# Patient Record
Sex: FEMALE | Race: ASIAN | NOT HISPANIC OR LATINO
[De-identification: names, ages, dates, MRNs, and addresses within clinical notes are randomized per-mention and may not be internally consistent; named-entity substitution may affect disease eponyms.]

---

## 2019-05-02 ENCOUNTER — APPOINTMENT (OUTPATIENT)
Dept: OTOLARYNGOLOGY | Facility: CLINIC | Age: 54
End: 2019-05-02
Payer: MEDICAID

## 2019-05-02 VITALS
DIASTOLIC BLOOD PRESSURE: 83 MMHG | SYSTOLIC BLOOD PRESSURE: 128 MMHG | BODY MASS INDEX: 25.6 KG/M2 | HEIGHT: 59 IN | HEART RATE: 78 BPM | WEIGHT: 127 LBS

## 2019-05-02 DIAGNOSIS — Z83.3 FAMILY HISTORY OF DIABETES MELLITUS: ICD-10-CM

## 2019-05-02 PROCEDURE — 99204 OFFICE O/P NEW MOD 45 MIN: CPT | Mod: 25

## 2019-05-02 PROCEDURE — 92557 COMPREHENSIVE HEARING TEST: CPT

## 2019-05-02 PROCEDURE — 92567 TYMPANOMETRY: CPT

## 2019-05-02 RX ORDER — ATORVASTATIN CALCIUM 10 MG/1
10 TABLET, FILM COATED ORAL
Refills: 0 | Status: ACTIVE | COMMUNITY

## 2019-05-02 NOTE — ASSESSMENT
[FreeTextEntry1] : 53 y/o female w/ b/l tinnitus R>L, intermittent dizzy spells and SNHL\par \par -Will f/u VNG and ABR \par \par tinnitus- mild- pt reassured \par \par \par Will call pt with results

## 2019-05-02 NOTE — PHYSICAL EXAM
[Midline] : trachea located in midline position [Normal] : horizontal positional vertigo maneuver was normal [Fukuda Step Test] : Fukuda Step Test was Positive [Hearing Loss Right Only] : normal [Hearing Loss Left Only] : normal [Nystagmus] : ~T no ~M nystagmus was seen [FreeTextEntry9] : justin  [de-identified] : mild weakness to the left during step test

## 2019-05-02 NOTE — HISTORY OF PRESENT ILLNESS
[No] : patient does not have a  history of radiation therapy [Tinnitus] : tinnitus [Dizziness] : dizziness [Vertigo] : vertigo [None] : No risk factors have been identified. [de-identified] : 55 y/o female who came in complaining of tinnitus in b/l ears that is now just affecting her R ear that has been going on for 5 months. She states now she is having a buzzing/beeping noise in her head for the past month. She states she will become dizzy at times when she walks that started last month. She describes the dizziness as feeling lightheaded or she is going to fall. She states she will feel like the room is spinning when she stands at times that lasts for a couple of minutes. She states she has no change in her hearing. Pt has no ear pain, ear drainage, hearing loss, nasal congestion, nasal discharge, epistaxis, sinus infections, facial pain, facial pressure, throat pain, dysphagia or fevers.\par  [Hearing Loss] : no hearing loss [Anxiety] : no anxiety [Headache] : no headache [Otorrhea] : no otorrhea [Otalgia] : no otalgia [Recurrent Otitis Media] : no recurrent otitis media [Otitis Media with Effusion] : no otitis media with effusion [Congenital Ear Malformation] : no congenital ear malformation [Presbycusis] : no presbycusis [Meniere Disease] : no Meniere disease [Perilymphatic Fistula] : no perilymphatic fistula [Otosclerosis] : no otosclerosis [Loud Noise Exposure] : no history of loud noise exposure [Hypertension] : no hypertension [Early Onset Hearing Loss] : no early onset hearing loss [Smoking] : no smoking [Stroke] : no stroke [Clear Rhinorrhea] : no clear rhinorrhea [Facial Pain] : no facial pain [Facial Pressure] : no facial pressure [Purulent Rhinorrhea] : no purulent rhinorrhea [Nasal Congestion] : no nasal congestion [Postnasal Drainage] : no postnasal drainage [Ear Pain] : no ear pain [Ear Pressure] : no ear pressure [Ear Fullness] : no ear fullness [Diplopia] : no diplopia [Allergic Rhinitis] : no allergic rhinitis [Environmental Allergies] : no environmental allergies [Environmental Allergens] : no environmental allergens [Seasonal Allergies] : no seasonal allergies [Allergies] : no allergies [Adenoidectomy] : no adenoidectomy [Asthma] : no asthma [Neck Mass] : no neck mass [Chills] : no chills [Neck Pain] : no neck pain [Cold Intolerance] : no cold intolerance [Heat Intolerance] : no heat intolerance [Cough] : no cough [Fatigue] : no fatigue [Sialadenitis] : no sialadenitis [Hyperthyroidism] : no hyperthyroidism [Non-Hodgkin Lymphoma] : no non-hodgkin lymphoma [Hodgkin Disease] : no hodgkin disease [Tobacco Use] : no tobacco use [Alcohol Use] : no alcohol use [Thyroid Cancer] : no thyroid cancer [Graves Disease] : no graves disease

## 2019-05-16 DIAGNOSIS — R42 DIZZINESS AND GIDDINESS: ICD-10-CM

## 2019-05-16 DIAGNOSIS — H61.23 IMPACTED CERUMEN, BILATERAL: ICD-10-CM

## 2019-05-16 DIAGNOSIS — H90.3 SENSORINEURAL HEARING LOSS, BILATERAL: ICD-10-CM

## 2019-05-16 DIAGNOSIS — H93.13 TINNITUS, BILATERAL: ICD-10-CM

## 2020-07-19 ENCOUNTER — EMERGENCY (EMERGENCY)
Facility: HOSPITAL | Age: 55
LOS: 1 days | Discharge: HOME | End: 2020-07-19
Attending: STUDENT IN AN ORGANIZED HEALTH CARE EDUCATION/TRAINING PROGRAM | Admitting: EMERGENCY MEDICINE
Payer: MEDICAID

## 2020-07-19 VITALS
SYSTOLIC BLOOD PRESSURE: 138 MMHG | TEMPERATURE: 99 F | DIASTOLIC BLOOD PRESSURE: 91 MMHG | HEART RATE: 83 BPM | RESPIRATION RATE: 16 BRPM | OXYGEN SATURATION: 99 %

## 2020-07-19 LAB
APPEARANCE UR: CLEAR — SIGNIFICANT CHANGE UP
BILIRUB UR-MCNC: NEGATIVE — SIGNIFICANT CHANGE UP
BLOOD UR QL VISUAL: NEGATIVE — SIGNIFICANT CHANGE UP
COLOR SPEC: YELLOW — SIGNIFICANT CHANGE UP
GLUCOSE UR-MCNC: NEGATIVE — SIGNIFICANT CHANGE UP
KETONES UR-MCNC: NEGATIVE — SIGNIFICANT CHANGE UP
LEUKOCYTE ESTERASE UR-ACNC: NEGATIVE — SIGNIFICANT CHANGE UP
NITRITE UR-MCNC: NEGATIVE — SIGNIFICANT CHANGE UP
PH UR: 6.5 — SIGNIFICANT CHANGE UP (ref 5–8)
PROT UR-MCNC: 30 — SIGNIFICANT CHANGE UP
RBC CASTS # UR COMP ASSIST: SIGNIFICANT CHANGE UP (ref 0–?)
SP GR SPEC: 1.02 — SIGNIFICANT CHANGE UP (ref 1–1.04)
UROBILINOGEN FLD QL: NORMAL — SIGNIFICANT CHANGE UP
WBC UR QL: SIGNIFICANT CHANGE UP (ref 0–?)

## 2020-07-19 PROCEDURE — 99283 EMERGENCY DEPT VISIT LOW MDM: CPT

## 2020-07-19 RX ORDER — LIDOCAINE 4 G/100G
1 CREAM TOPICAL ONCE
Refills: 0 | Status: COMPLETED | OUTPATIENT
Start: 2020-07-19 | End: 2020-07-19

## 2020-07-19 RX ORDER — ACETAMINOPHEN 500 MG
975 TABLET ORAL ONCE
Refills: 0 | Status: COMPLETED | OUTPATIENT
Start: 2020-07-19 | End: 2020-07-19

## 2020-07-19 RX ORDER — DIAZEPAM 5 MG
5 TABLET ORAL ONCE
Refills: 0 | Status: DISCONTINUED | OUTPATIENT
Start: 2020-07-19 | End: 2020-07-19

## 2020-07-19 RX ADMIN — Medication 975 MILLIGRAM(S): at 14:15

## 2020-07-19 RX ADMIN — Medication 5 MILLIGRAM(S): at 15:15

## 2020-07-19 RX ADMIN — LIDOCAINE 1 PATCH: 4 CREAM TOPICAL at 14:15

## 2020-07-19 NOTE — ED PROVIDER NOTE - CLINICAL SUMMARY MEDICAL DECISION MAKING FREE TEXT BOX
55 y.o. female here for right lower back pain, achy w/o radiation around quadratus lumborum. No lower extremity motor or sensory changes, bowel or bladder dysfunction, saddle anesthesia. vitals wnl. Exam w/o midline spinal tenderness. Sxs likely msk in nature (muscle sprain, overuse. Consider renal pathology such as renal stone. Will treat pain, screening UA, reassess. Patient does not want ibuprofen.

## 2020-07-19 NOTE — ED PROVIDER NOTE - PROGRESS NOTE DETAILS
Kashif Leon D.O., PGY2 (Resident)  Patient feels improved. Able to move better and ambulate better. Endorsed normal UA results. Return precautions given. Vinay GONZALEZ.

## 2020-07-19 NOTE — ED PROVIDER NOTE - NS ED ROS FT
CONSTITUTIONAL: No fevers, chills, fatigue, dizziness, lightheadedness weakness, unexpected weight change,   HEENT: No loss of vision, double vision, blurry vision, ear drainage, nasal congestion, runny nose, sore throat  CV: No chest pain, palpitations  PULM: No cough, shortness of breath  GI: No abdominal pain, nausea, vomiting, diarrhea, constipation, bloody stools  : No dysuria, polyuria, hematuria  SKIN: No rashes, swelling  MSK: RIGHT LOWER BACK PAIN.  NEURO: no headache, paresthesias

## 2020-07-19 NOTE — ED PROVIDER NOTE - PHYSICAL EXAMINATION
GENERAL: middle aged female, lying in bed, NAD. Vital signs are within normal limits  HEENT: NC/AT, conjunctiva noninjected and sclera anicteric, moist mucous membranes,  NECK: Supple, trachea midline  LUNG: CTAB, no w/r/r appreciated  CV: RRR, no m/r/g appreciated, Pulses- Radial: 2+ b/l; posterior tibial: 2+ b/l; dorsalis pedis: 2+ b/l  ABDOMEN: Soft, NTND, no rebound or guarding, no hernias, no pulsatile masses  BACK: No midline spinal tenderness or step-offs. right lower back tenderness to palpation around quadratus lumborum  MSK: No edema, no visible deformities, full range of motion UE/LE bilateral except pain with torso flexion, 5/5 muscle strength UE/LE b/l, nontender extremities  NEURO: AAOx4 (to person, place, time, event), sensation grossly intact, finger-to-nose smooth and rapid, no pronator drift, steady gait, neg straight leg raise  -Cranial Nerves:  --CN II: PERRL  --CN III, IV, VI: EOMI b/l  --CN V1-3: Facial sensation intact to touch  --CN VII: No facial asymmetry or droop  --CN VIII: Hearing intact to rubbing fingers b/l  --CN IX, X: Palate elevates symmetrically. Normal phonation  --CN XI: Heading turning and shoulder shrug intact b/l  --CN XII: Tongue midline with normal movements   SKIN: Warm, dry, well perfused, no evidence of rash  PSYCH: Normal mood and affect

## 2020-07-19 NOTE — ED PROVIDER NOTE - ATTENDING CONTRIBUTION TO CARE
55 year old female no past medical history presents for right lumbar back pain x 2 weeks. Pains began after doing yard work. They make it worse yesterday which prompted presentation to Ed. Patient denies fever, chills, abdominal pain, urinary complaints, numbness, weakness, trauma.   exam as above. Low back pain, neurovascular intact. suspect musculoskeletal related, less likely UTI or kidney stone. Plan symptom relief, UA, reassess

## 2020-07-19 NOTE — ED PROVIDER NOTE - PATIENT PORTAL LINK FT
You can access the FollowMyHealth Patient Portal offered by [facility] by registering at the following website: [email or web address]. By joining [employer]’s FollowMyHealth portal, you will also be able to view your health information using other applications (apps) compatible with our system.

## 2020-07-19 NOTE — ED PROVIDER NOTE - NSFOLLOWUPINSTRUCTIONS_ED_ALL_ED_FT
YOU WERE SEEN IN THE ED FOR: back pain    FOR PAIN/FEVER, YOU MAY TAKE TYLENOL (acetaminophen) AND/OR IBUPROFEN (advil or motrin). FOLLOW THE INSTRUCTIONS ON THE LABEL/CONTAINER.3  -you may take tylenol and ibuprofen at the time same. You may also buy Salon Pas over the counter at your pharmacy and apply it to the affected area.  -rest, do not do excessive bending over, lifting of heavy objects     PLEASE FOLLOW UP WITH YOUR PRIVATE PHYSICIAN WITHIN THE NEXT 72 HOURS. BRING COPIES OF YOUR RESULTS.  -If you do not have a PMD, please call [phone] to find one convenient for you or call our clinic at [phone].    RETURN TO THE EMERGENCY DEPARTMENT IF YOU EXPERIENCE ANY NEW/CONCERNING/WORSENING SYMPTOMS.

## 2020-07-19 NOTE — ED PROVIDER NOTE - OBJECTIVE STATEMENT
55 y.o. female presents to the ED for nonradiating, achy right lower back pain x 2 weeks after working in the yard, described as achy. Pain worse yesterday after picking up containers of water hence why patient came to the ED. Pain worse with forward flexion. Has been taking 1 naproxen in the am, last took at 4am today with minimal relief. Denies bowel/bladder incontience, saddle anesthesia, weakness in the legs. Denies numbness, tingling, fever, chills, urinary complaints. No hx of kidney stones.